# Patient Record
Sex: MALE | Employment: UNEMPLOYED | URBAN - METROPOLITAN AREA
[De-identification: names, ages, dates, MRNs, and addresses within clinical notes are randomized per-mention and may not be internally consistent; named-entity substitution may affect disease eponyms.]

---

## 2024-04-16 ENCOUNTER — OFFICE VISIT (OUTPATIENT)
Dept: DERMATOLOGY | Facility: CLINIC | Age: 34
End: 2024-04-16

## 2024-04-16 VITALS — WEIGHT: 161 LBS | TEMPERATURE: 98 F

## 2024-04-16 DIAGNOSIS — L72.0 EPIDERMAL CYST: ICD-10-CM

## 2024-04-16 DIAGNOSIS — L03.019 PARONYCHIA OF FINGER, UNSPECIFIED LATERALITY: ICD-10-CM

## 2024-04-16 DIAGNOSIS — B00.9 HERPES: Primary | ICD-10-CM

## 2024-04-16 PROCEDURE — 99204 OFFICE O/P NEW MOD 45 MIN: CPT | Performed by: DERMATOLOGY

## 2024-04-16 RX ORDER — VALACYCLOVIR HYDROCHLORIDE 500 MG/1
500 TABLET, FILM COATED ORAL DAILY
COMMUNITY

## 2024-04-16 RX ORDER — PAROXETINE HYDROCHLORIDE HEMIHYDRATE 12.5 MG/1
12.5 TABLET, FILM COATED, EXTENDED RELEASE ORAL EVERY MORNING
COMMUNITY

## 2024-04-16 RX ORDER — VALACYCLOVIR HYDROCHLORIDE 500 MG/1
500 TABLET, FILM COATED ORAL DAILY
Qty: 90 TABLET | Refills: 3 | Status: SHIPPED | OUTPATIENT
Start: 2024-04-16 | End: 2025-04-16

## 2024-04-16 NOTE — PATIENT INSTRUCTIONS
HERPES SIMPLEX    Assessment and Plan:  Based on a thorough discussion of this condition and the management approach to it (including a comprehensive discussion of the known risks, side effects and potential benefits of treatment), the patient (family) agrees to implement the following specific plan:  Continue valacyclovir 500 mg daily. Sent to pharmacy. Patient should use Good Rx card given to him in clinic today.    What is herpes simplex?  Herpes simplex is a common viral infection that presents with localized blistering. It affects most people on one or more occasions during their lives.  Herpes simplex is commonly referred to as cold sores or fever blisters, as recurrences are often triggered by a febrile illness, such as a cold.    What causes herpes simplex?  Herpes simplex is caused by one of two types of herpes simplex virus (HSV), members of the Herpesvirales family of double-stranded DNA viruses.  Type 1 HSV is mainly associated with oral and facial infections   Type 2 HSV is mainly associated with genital and rectal infections (anogenital herpes)    However, either virus can affect almost any area of skin or mucous membrane.  After the primary episode of infection, HSV resides in a latent state in spinal dorsal root nerves that supply sensation to the skin. During a recurrence, the virus follows the nerves onto the skin or mucous membranes, where it multiplies, causing the clinical lesion. After each attack and lifelong, it enters the resting state.  During an attack, the virus can be inoculated into new sites of skin, which can then develop blisters as well as the original site of infection.    Who gets herpes simplex?  Primary attacks of Type 1 HSV infections occur mainly in infants and young children. In crowded, underdeveloped areas of the world, nearly all children have been infected by the age of 5. In less crowded places, the incidence is lower; for example, less than half of university entrants  in [facility] have been infected. Type 2 HSV infections occur mainly after puberty and are often transmitted sexually.    HSV is transmitted by direct or indirect contact with someone with active herpes simplex, which is infectious for 7-12 days. Asymptomatic shedding of the virus in saliva or genital secretions can also lead to transmission of HSV, but this is infrequent, as the amount shed from inactive lesions is 100 to 1000 times less than when it is active. The incubation period is 2-12 days.  Minor injury helps inoculate HSV into the skin. For example:  A thumb sucker may transmit the virus from their mouth to their thumb.   A health-care worker may develop herpetic gissell (paronychia)   A rugby player may get a cluster of blisters on one cheek ('scrumpox').    What are the clinical features of herpes simplex?  Primary infection with HSV can be mild or subclinical, but symptomatic infection tends to be more severe than recurrences. Type 2 HSV is more often symptomatic than Type 1 HSV.  Primary Type 1 HSV most often presents as gingivostomatitis, in children between 1 and 5 years of age. Symptoms include fever, which may be high, restlessness and excessive dribbling. Drinking and eating are painful, and the breath is foul. The gums are swollen and red and bleed easily. Whitish vesicles evolve to yellowish ulcers on the tongue, throat, palate and inside the cheeks. Local lymph glands are enlarged and tender.  The fever subsides after 3-5 days and recovery is usually complete within 2 weeks.  Primary Type 2 HSV usually presents as genital herpes after the onset of sexual activity. Painful vesicles, ulcers, redness and swelling last for 2 to 3 weeks, if untreated, and are often accompanied by fever and tender inguinal lymphadenopathy.    In males, herpes most often affects the glans, foreskin and shaft of the penis. Anal herpes is more common in males who have sex with men than with heterosexual partners. In  females, herpes most often arises on the vulva and in the vagina. It is often painful or difficult to pass urine. Infection of the cervix may progress to severe ulceration.    Recurrent herpes simplex  After the initial infection, whether symptomatic or not, there may be no further clinical manifestations throughout life. Where viral immunity is insufficient, recurrent infections are common, particularly with Type 2 genital herpes.  Recurrences can be triggered by:  Minor trauma, surgery or procedures to the affected area   Upper respiratory tract infections   Sun exposure   Hormonal factors (in women, flares are not uncommon prior to menstruation)   Emotional stress    In many cases, no reason for the eruption is evident.  The vesicles tend to be smaller and more closely grouped in recurrent herpes, compared to primary herpes. They usually return to roughly the same site as the primary infection.  Recurrent Type 1 HSV can occur on any site, most frequently the face, particularly the lips (herpes simplex labialis).   Recurrent Type 2 HSV may also occur on any site, but most often affects the genitals or buttocks.    Itching or burning is followed an hour or two later by an irregular cluster of small, closely grouped, often umbilicated vesicles on a red base. They normally heal in 7-10 days without scarring. The affected person may feel well or suffer from fever, pain and have enlarged local lymph nodes.  Herpetic vesicles are sometimes arranged in a line rather like shingles and are said to have a zosteriform distribution, particularly when affecting the lower chest or lumbar region. White patches or scars may occur at the site of recurrent HSV attacks and are more evident in those with the skin of color.    How is herpes simplex diagnosed?  If there is clinical doubt, HSV can be confirmed by culture or PCR of a viral swab taken from fresh vesicles. HSV serology is not very informative, as it's positive in most  individuals and thus not specific for the lesion with which they present.    What are the complications of herpes simplex?  Eye infection: Herpes simplex may cause swollen eyelids and conjunctivitis with opacity and superficial ulceration of the cornea (dendritic ulcer, best seen after fluorescein staining of the cornea).  Throat infection: may be very painful and interfere with swallowing.  Eczema herpeticum: In patients with a history of atopic dermatitis or Darier disease, HSV may result in severe and widespread infection, known as eczema herpeticum. The skin disease can be active or historical. Numerous blisters erupt on the face or elsewhere, associated with swollen lymph glands and fever.  Erythema multiform: A single episode or recurrent erythema multiforme is an uncommon reaction to herpes simplex. The rash of erythema multiforme appears as symmetrical plaques on hands, forearms, feet and lower legs. It is characterised by target lesions, which sometimes have central blisters. Mucosal lesions may be observed.  Nervous system: Cranial/facial nerves may be infected by HSV, producing temporary paralysis of the affected muscles. Rarely, neuralgic pain may precede each recurrence of herpes by 1 or 2 days (Aguilar syndrome). Meningitis is rare.  Widespread infection: Disseminated infection and/or persistent ulceration due to HSV can be serious in debilitated or immune deficient patients, for example in people with human immunodeficiency virus (HIV) infection.    What is the treatment for herpes simplex?  Mild, uncomplicated eruptions of herpes simplex require no treatment. Blisters may be covered if desired, for example with a hydrocolloid patch. Severe infection may require treatment with an antiviral agent. Antiviral drugs used for herpes simplex and their usual doses are:  Aciclovir - 200 mg 5 times daily for five days   Valaciclovir - 1 g 3 times daily for seven days   Famciclovir - as a single dose of 3 x 500  mg    Higher doses of antiviral drugs are used for eczema herpeticum or for disseminated herpes simplex.  Topical aciclovir or penciclovir may shorten attacks of recurrent herpes simplex, provided the cream is started early enough.    Can herpes simplex be prevented?  As sun exposure often triggers facial herpes simplex, sun protection using high protection factor sunscreens and other measures are important.  Antiviral drugs will stop HSV multiplying once it reaches the skin or mucous membranes but cannot eradicate the virus from its resting stage within the nerve cells. They can, therefore, shorten and prevent attacks but a single course cannot prevent future attacks. Repeated courses may be prescribed, or the medication may be taken continuously to prevent frequent attacks.    EPIDERMAL CYST    Assessment and Plan:  Based on a thorough discussion of this condition and the management approach to it (including a comprehensive discussion of the known risks, side effects and potential benefits of treatment), the patient (family) agrees to implement the following specific plan:  Reassure benign  Continue to monitor    CHRONIC PARONYCHIA    Assessment and Plan:  Based on a thorough discussion of this condition and the management approach to it (including a comprehensive discussion of the known risks, side effects and potential benefits of treatment), the patient (family) agrees to implement the following specific plan:  Start over the counter Lamisil Cream - can be purchased at the pharmacy  Keep hands dry

## 2024-04-16 NOTE — PROGRESS NOTES
"[facility]at[facility] Clinic Note     Patient Name: Shady Laguerre  Encounter Date: 4/16/2024     Have you been cared for by a [facility] Dermatologist in the last 3 years and, if so, which description applies to you?    NO.   I am considered a \"new\" patient and must complete all patient intake questions. I am MALE/not capable of bearing children.    REVIEW OF SYSTEMS:  Have you recently had or currently have any of the following? Recent fever or chills? No  Any non-healing wound? No   PAST MEDICAL HISTORY:  Have you personally ever had or currently have any of the following?  If \"YES,\" then please provide more detail. Skin cancer (such as Melanoma, Basal Cell Carcinoma, Squamous Cell Carcinoma?  No  Tuberculosis, HIV/AIDS, Hepatitis B or C: No  Radiation Treatment No   HISTORY OF IMMUNOSUPPRESSION:   Do you have a history of any of the following:  Systemic Immunosuppression such as Diabetes, Biologic or Immunotherapy, Chemotherapy, Organ Transplantation, Bone Marrow Transplantation?  No     Answering \"YES\" requires the addition of the dotphrase \"IMMUNOSUPPRESSED\" as the first diagnosis of the patient's visit.   FAMILY HISTORY:  Any \"first degree relatives\" (parent, brother, sister, or child) with the following?    Skin Cancer, Pancreatic or Other Cancer? No   PATIENT EXPERIENCE:    Do you want the Dermatologist to perform a COMPLETE skin exam today including a clinical examination under the \"bra and underwear\" areas?  NO  If necessary, do we have your permission to call and leave a detailed message on your Preferred Phone number that includes your specific medical information?  Yes      Not on File   Current Outpatient Medications:   •  CICLOPIROX & LACQUER REMOVAL EX, Apply topically, Disp: , Rfl:   •  PARoxetine (PAXIL-CR) 12.5 mg 24 hr tablet, Take 12.5 mg by mouth every morning, Disp: , Rfl:   •  valACYclovir (VALTREX) 500 mg tablet, Take 500 mg by mouth daily, Disp: , Rfl:   •  valACYclovir (VALTREX) 500 mg " tablet, Take 1 tablet (500 mg total) by mouth daily, Disp: 90 tablet, Rfl: 3        Whom besides the patient is providing clinical information about today's encounter?   NO ADDITIONAL HISTORIAN (patient alone provided history)    Physical Exam and Assessment/Plan by Diagnosis:    HERPES SIMPLEX    Physical Exam:  Anatomic Location Affected:  genital region  Morphological Description:  clear  Pertinent Positives:  Pertinent Negatives:    Additional History of Present Condition:  patient reports he was diagnosed in [place] in 2017. He notes he is on daily valacyclovir 500 mg and that helps. He notes he is currently having a flare. 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     Assessment and Plan:  Based on a thorough discussion of this condition and the management approach to it (including a comprehensive discussion of the known risks, side effects and potential benefits of treatment), the patient (family) agrees to implement the following specific plan:  Continue valacyclovir 500 mg daily. Sent to pharmacy. Patient should use Good Rx card given to him in clinic today.    What is herpes simplex?  Herpes simplex is a common viral infection that presents with localized blistering. It affects most people on one or more occasions during their lives.  Herpes simplex is commonly referred to as cold sores or fever blisters, as recurrences are often triggered by a febrile illness, such as a cold.    What causes herpes simplex?  Herpes simplex is caused by one of two types of herpes simplex virus (HSV), members of the Herpesvirales family of double-stranded DNA viruses.  Type 1 HSV is mainly associated with oral and facial infections   Type 2 HSV is mainly associated with genital and rectal infections (anogenital herpes)    However, either virus can affect almost any area of skin or mucous membrane.  After the primary episode of infection, HSV resides in a latent state in spinal dorsal root nerves that  supply sensation to the skin. During a recurrence, the virus follows the nerves onto the skin or mucous membranes, where it multiplies, causing the clinical lesion. After each attack and lifelong, it enters the resting state.  During an attack, the virus can be inoculated into new sites of skin, which can then develop blisters as well as the original site of infection.    Who gets herpes simplex?  Primary attacks of Type 1 HSV infections occur mainly in infants and young children. In crowded, underdeveloped areas of the world, nearly all children have been infected by the age of 5. In less crowded places, the incidence is lower; for example, less than half of university entrants in [facility] have been infected. Type 2 HSV infections occur mainly after puberty and are often transmitted sexually.    HSV is transmitted by direct or indirect contact with someone with active herpes simplex, which is infectious for 7-12 days. Asymptomatic shedding of the virus in saliva or genital secretions can also lead to transmission of HSV, but this is infrequent, as the amount shed from inactive lesions is 100 to 1000 times less than when it is active. The incubation period is 2-12 days.  Minor injury helps inoculate HSV into the skin. For example:  A thumb sucker may transmit the virus from their mouth to their thumb.   A health-care worker may develop herpetic gissell (paronychia)   A rugby player may get a cluster of blisters on one cheek ('scrumpox').    What are the clinical features of herpes simplex?  Primary infection with HSV can be mild or subclinical, but symptomatic infection tends to be more severe than recurrences. Type 2 HSV is more often symptomatic than Type 1 HSV.  Primary Type 1 HSV most often presents as gingivostomatitis, in children between 1 and 5 years of age. Symptoms include fever, which may be high, restlessness and excessive dribbling. Drinking and eating are painful, and the breath is foul. The gums are  swollen and red and bleed easily. Whitish vesicles evolve to yellowish ulcers on the tongue, throat, palate and inside the cheeks. Local lymph glands are enlarged and tender.  The fever subsides after 3-5 days and recovery is usually complete within 2 weeks.  Primary Type 2 HSV usually presents as genital herpes after the onset of sexual activity. Painful vesicles, ulcers, redness and swelling last for 2 to 3 weeks, if untreated, and are often accompanied by fever and tender inguinal lymphadenopathy.    In males, herpes most often affects the glans, foreskin and shaft of the penis. Anal herpes is more common in males who have sex with men than with heterosexual partners. In females, herpes most often arises on the vulva and in the vagina. It is often painful or difficult to pass urine. Infection of the cervix may progress to severe ulceration.    Recurrent herpes simplex  After the initial infection, whether symptomatic or not, there may be no further clinical manifestations throughout life. Where viral immunity is insufficient, recurrent infections are common, particularly with Type 2 genital herpes.  Recurrences can be triggered by:  Minor trauma, surgery or procedures to the affected area   Upper respiratory tract infections   Sun exposure   Hormonal factors (in women, flares are not uncommon prior to menstruation)   Emotional stress    In many cases, no reason for the eruption is evident.  The vesicles tend to be smaller and more closely grouped in recurrent herpes, compared to primary herpes. They usually return to roughly the same site as the primary infection.  Recurrent Type 1 HSV can occur on any site, most frequently the face, particularly the lips (herpes simplex labialis).   Recurrent Type 2 HSV may also occur on any site, but most often affects the genitals or buttocks.    Itching or burning is followed an hour or two later by an irregular cluster of small, closely grouped, often umbilicated vesicles on  a red base. They normally heal in 7-10 days without scarring. The affected person may feel well or suffer from fever, pain and have enlarged local lymph nodes.  Herpetic vesicles are sometimes arranged in a line rather like shingles and are said to have a zosteriform distribution, particularly when affecting the lower chest or lumbar region. White patches or scars may occur at the site of recurrent HSV attacks and are more evident in those with the skin of color.    How is herpes simplex diagnosed?  If there is clinical doubt, HSV can be confirmed by culture or PCR of a viral swab taken from fresh vesicles. HSV serology is not very informative, as it's positive in most individuals and thus not specific for the lesion with which they present.    What are the complications of herpes simplex?  Eye infection: Herpes simplex may cause swollen eyelids and conjunctivitis with opacity and superficial ulceration of the cornea (dendritic ulcer, best seen after fluorescein staining of the cornea).  Throat infection: may be very painful and interfere with swallowing.  Eczema herpeticum: In patients with a history of atopic dermatitis or Darier disease, HSV may result in severe and widespread infection, known as eczema herpeticum. The skin disease can be active or historical. Numerous blisters erupt on the face or elsewhere, associated with swollen lymph glands and fever.  Erythema multiform: A single episode or recurrent erythema multiforme is an uncommon reaction to herpes simplex. The rash of erythema multiforme appears as symmetrical plaques on hands, forearms, feet and lower legs. It is characterised by target lesions, which sometimes have central blisters. Mucosal lesions may be observed.  Nervous system: Cranial/facial nerves may be infected by HSV, producing temporary paralysis of the affected muscles. Rarely, neuralgic pain may precede each recurrence of herpes by 1 or 2 days (Aguilar syndrome). Meningitis is  rare.  Widespread infection: Disseminated infection and/or persistent ulceration due to HSV can be serious in debilitated or immune deficient patients, for example in people with human immunodeficiency virus (HIV) infection.    What is the treatment for herpes simplex?  Mild, uncomplicated eruptions of herpes simplex require no treatment. Blisters may be covered if desired, for example with a hydrocolloid patch. Severe infection may require treatment with an antiviral agent. Antiviral drugs used for herpes simplex and their usual doses are:  Aciclovir - 200 mg 5 times daily for five days   Valaciclovir - 1 g 3 times daily for seven days   Famciclovir - as a single dose of 3 x 500 mg    Higher doses of antiviral drugs are used for eczema herpeticum or for disseminated herpes simplex.  Topical aciclovir or penciclovir may shorten attacks of recurrent herpes simplex, provided the cream is started early enough.    Can herpes simplex be prevented?  As sun exposure often triggers facial herpes simplex, sun protection using high protection factor sunscreens and other measures are important.  Antiviral drugs will stop HSV multiplying once it reaches the skin or mucous membranes but cannot eradicate the virus from its resting stage within the nerve cells. They can, therefore, shorten and prevent attacks but a single course cannot prevent future attacks. Repeated courses may be prescribed, or the medication may be taken continuously to prevent frequent attacks.    EPIDERMAL CYST  Physical Exam:  Anatomic Location Affected:  scrotum  Morphological Description:  normal  Pertinent Positives:  Pertinent Negatives:    Additional History of Present Condition:  patient reports has been present for many years. He denies any pain, itching, drainage.    Assessment and Plan:  Based on a thorough discussion of this condition and the management approach to it (including a comprehensive discussion of the known risks, side effects and  potential benefits of treatment), the patient (family) agrees to implement the following specific plan:  Reassure benign    CHRONIC PARONYCHIA  Physical Exam:  Anatomic Location Affected:  left thumb, left index finger, left ring finger  Morphological Description:  mild parocnychila erythema  Pertinent Positives:  Pertinent Negatives:    Additional History of Present Condition:  patient was using ciclopirox lacquer    Assessment and Plan:  Based on a thorough discussion of this condition and the management approach to it (including a comprehensive discussion of the known risks, side effects and potential benefits of treatment), the patient (family) agrees to implement the following specific plan:  Start over the counter Lamisil Cream - can be purchased at the pharmacy  Keep hands dry      Scribe Attestation    I,:  Iris Fraga am acting as a scribe while in the presence of the attending physician.:       I,:  Marc Hoover MD personally performed the services described in this documentation    as scribed in my presence.:

## 2024-04-18 ENCOUNTER — TELEPHONE (OUTPATIENT)
Age: 34
End: 2024-04-18

## 2024-04-18 NOTE — TELEPHONE ENCOUNTER
Pt called and asked to speak with Maggie or the CTS who he saw before Dr Hoover    Please call pt back

## 2024-09-04 DIAGNOSIS — B00.9 HERPES: ICD-10-CM

## 2024-09-04 RX ORDER — VALACYCLOVIR HYDROCHLORIDE 500 MG/1
500 TABLET, FILM COATED ORAL DAILY
Qty: 180 TABLET | Refills: 0 | Status: SHIPPED | OUTPATIENT
Start: 2024-09-04 | End: 2025-09-04

## 2024-09-04 NOTE — TELEPHONE ENCOUNTER
Patient is requesting 180 tablets    Reason for call:   [x] Refill   [] Prior Auth  [] Other:     Office:   [] PCP/Provider -   [x] Specialty/Provider -       Does the patient have enough for 3 days?   [x] Yes   [] No - Send as HP to POD

## 2024-09-17 ENCOUNTER — OFFICE VISIT (OUTPATIENT)
Dept: URGENT CARE | Facility: CLINIC | Age: 34
End: 2024-09-17

## 2024-09-17 VITALS
DIASTOLIC BLOOD PRESSURE: 78 MMHG | HEART RATE: 77 BPM | WEIGHT: 155 LBS | BODY MASS INDEX: 24.33 KG/M2 | SYSTOLIC BLOOD PRESSURE: 141 MMHG | HEIGHT: 67 IN | RESPIRATION RATE: 20 BRPM

## 2024-09-17 DIAGNOSIS — Z02.4 DRIVER'S PERMIT PHYSICAL EXAMINATION: Primary | ICD-10-CM

## 2024-09-17 NOTE — PROGRESS NOTES
[facility]        NAME: Shady Laguerre is a 34 y.o. male  : 1990    MRN: [id number]  DATE: 2024  TIME: 7:46 PM    Assessment and Plan   's permit physical examination [Z02.4]  1. 's permit physical examination        It is my medical option that patient has no history of or evidence of chronic illnesses that would limit their ability to drive a non-commercial vehicle safely.   [place] DOT paperwork was filled out to this effect and originals were returned to the patient.           Patient Instructions     Patient Instructions   Drive safely and carefully         If tests have been performed at [facility], our office will contact you with results if changes need to be made to the care plan discussed with you at the visit. [facility]     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 Pt reports they have never driven in the [facility] or any other state. Pt reports they are healthy and denies history of neurologic, neuropsychiatric, and cardiac disease. Pt denies further history of hypertension, uncontrolled epilepsy, uncontrolled diabetes, lapses in consciousness, cognitive impairments, alcohol abuse, and illicit drug use. Pt reports they have all appendages and denies use of prosthetic devices.          Review of Systems   Review of Systems   Respiratory:  Negative for shortness of breath.    Cardiovascular:  Negative for chest pain and palpitations.   Musculoskeletal:  Negative for arthralgias and back pain.   Neurological:  Negative for dizziness, seizures, syncope, weakness, numbness and headaches.   All other systems reviewed and are negative.        Current Medications       Current Outpatient Medications:     CICLOPIROX & LACQUER REMOVAL EX, Apply topically, Disp: , Rfl:      "PARoxetine (PAXIL-CR) 12.5 mg 24 hr tablet, Take 12.5 mg by mouth every morning, Disp: , Rfl:     valACYclovir (VALTREX) 500 mg tablet, Take 500 mg by mouth daily, Disp: , Rfl:     valACYclovir (VALTREX) 500 mg tablet, Take 1 tablet (500 mg total) by mouth daily, Disp: 180 tablet, Rfl: 0    Current Allergies     Allergies as of 09/17/2024    (No Known Allergies)            The following portions of the patient's history were reviewed and updated as appropriate: allergies, current medications, past family history, past medical history, past social history, past surgical history and problem list.     No past medical history on file.    No past surgical history on file.    No family history on file.      Medications have been verified.        Objective   /78   Pulse 77   Resp 20   Ht 5' 7\" (1.702 m)   Wt 70.3 kg (155 lb)   BMI 24.28 kg/m²   No LMP for male patient.       Physical Exam     Physical Exam  Vitals and nursing note reviewed.   Constitutional:       General: He is awake. He is not in acute distress.     Appearance: Normal appearance. He is well-developed and well-groomed. He is not ill-appearing, toxic-appearing or diaphoretic.   HENT:      Head: Normocephalic and atraumatic.      Right Ear: Hearing, tympanic membrane, ear canal and external ear normal.      Left Ear: Hearing, tympanic membrane, ear canal and external ear normal.      Nose: Nose normal.      Mouth/Throat:      Lips: Pink. No lesions.      Mouth: Mucous membranes are moist. No injury, lacerations or oral lesions.      Dentition: Normal dentition. No dental tenderness, gingival swelling, dental caries, dental abscesses or gum lesions.      Tongue: No lesions. Tongue does not deviate from midline.      Palate: No mass and lesions.      Pharynx: No pharyngeal swelling, oropharyngeal exudate, posterior oropharyngeal erythema or uvula swelling.      Tonsils: No tonsillar exudate.   Eyes:      General: Lids are normal. Vision grossly " intact. Gaze aligned appropriately. No visual field deficit or scleral icterus.        Right eye: No foreign body, discharge or hordeolum.         Left eye: No foreign body, discharge or hordeolum.      Extraocular Movements: Extraocular movements intact.      Conjunctiva/sclera: Conjunctivae normal.      Comments: Left eye vision:  Right eye vision:  Both eye vision:    Neck:      Thyroid: No thyroid mass or thyromegaly.      Vascular: No carotid bruit.   Cardiovascular:      Rate and Rhythm: Normal rate and regular rhythm.      Pulses:           Carotid pulses are 2+ on the right side and 2+ on the left side.       Radial pulses are 2+ on the right side and 2+ on the left side.        Posterior tibial pulses are 2+ on the right side and 2+ on the left side.      Heart sounds: S1 normal and S2 normal. Heart sounds not distant. No murmur heard.     No friction rub. No gallop.   Pulmonary:      Effort: Pulmonary effort is normal.      Breath sounds: Normal breath sounds and air entry. No stridor, decreased air movement or transmitted upper airway sounds. No decreased breath sounds, wheezing, rhonchi or rales.   Abdominal:      General: Abdomen is flat. Bowel sounds are normal.      Palpations: Abdomen is soft.      Tenderness: There is no abdominal tenderness.   Musculoskeletal:      Cervical back: Normal range of motion and neck supple.      Right lower leg: No edema.      Left lower leg: No edema.      Right foot: Normal range of motion.      Left foot: Normal range of motion.      Comments: Pt has full ROM about all UE and LE joints.  Strength is 5/5 BUE and BLE   Lymphadenopathy:      Cervical: No cervical adenopathy.   Skin:     General: Skin is warm and dry.   Neurological:      General: No focal deficit present.      Mental Status: He is alert and oriented to person, place, and time.      Sensory: Sensation is intact.      Motor: Motor function is intact.      Coordination: Coordination is intact.      Gait:  "Gait is intact.      Deep Tendon Reflexes: Reflexes are normal and symmetric.   Psychiatric:         Attention and Perception: Attention and perception normal.         Mood and Affect: Mood and affect normal.         Speech: Speech normal.         Behavior: Behavior normal. Behavior is cooperative.         Thought Content: Thought content normal.         Judgment: Judgment normal.               Note: Portions of this record may have been created with voice recognition software. Occasional wrong word or \"sound a like\" substitutions may have occurred due to the inherent limitations of voice recognition software. Please read the chart carefully and recognize, using context, where substitutions have occurred.*      "

## 2025-02-05 DIAGNOSIS — B00.9 HERPES: ICD-10-CM

## 2025-02-05 NOTE — TELEPHONE ENCOUNTER
Reason for call:   [x] Refill   [] Prior Auth  [] Other:     Office:   [] PCP/Provider -   [x] Specialty/Provider - [facility]     Medication: valACYclovir (VALTREX) 500 mg tab     Dose/Frequency: 500 mg, Daily     Quantity: 180    [facility]: [facility]     Does the patient have enough for 3 days?   [x] Yes   [] No - Send as HP to POD

## 2025-02-06 RX ORDER — VALACYCLOVIR HYDROCHLORIDE 500 MG/1
500 TABLET, FILM COATED ORAL DAILY
Qty: 90 TABLET | Refills: 0 | Status: SHIPPED | OUTPATIENT
Start: 2025-02-06 | End: 2025-02-10 | Stop reason: SDUPTHER

## 2025-02-10 NOTE — TELEPHONE ENCOUNTER
Patient called rx refill line requesting to know why the rx was sent as 90 tablets instead of 180    Reason for call:   [x] Refill   [] Prior Auth  [] Other:      Office:   [] PCP/Provider -   [x] Specialty/Provider - [facility]      Medication: valACYclovir (VALTREX) 500 mg tab      Dose/Frequency: 500 mg, Daily      Quantity: 180     [facility]: [facility]      Does the patient have enough for 3 days?   [x] Yes   [] No - Send as HP to POD

## 2025-02-12 RX ORDER — VALACYCLOVIR HYDROCHLORIDE 500 MG/1
500 TABLET, FILM COATED ORAL DAILY
Qty: 180 TABLET | Refills: 0 | Status: SHIPPED | OUTPATIENT
Start: 2025-02-12 | End: 2026-02-12